# Patient Record
Sex: FEMALE | Race: ASIAN | NOT HISPANIC OR LATINO
[De-identification: names, ages, dates, MRNs, and addresses within clinical notes are randomized per-mention and may not be internally consistent; named-entity substitution may affect disease eponyms.]

---

## 2017-07-10 ENCOUNTER — APPOINTMENT (OUTPATIENT)
Dept: OPHTHALMOLOGY | Facility: CLINIC | Age: 72
End: 2017-07-10

## 2018-03-26 ENCOUNTER — APPOINTMENT (OUTPATIENT)
Dept: OPHTHALMOLOGY | Facility: CLINIC | Age: 73
End: 2018-03-26
Payer: MEDICARE

## 2018-03-26 PROCEDURE — ZZZZZ: CPT

## 2018-03-26 PROCEDURE — 92012 INTRM OPH EXAM EST PATIENT: CPT

## 2018-03-26 PROCEDURE — 92083 EXTENDED VISUAL FIELD XM: CPT

## 2018-03-26 PROCEDURE — 92015 DETERMINE REFRACTIVE STATE: CPT

## 2018-10-01 ENCOUNTER — APPOINTMENT (OUTPATIENT)
Dept: OPHTHALMOLOGY | Facility: CLINIC | Age: 73
End: 2018-10-01
Payer: MEDICARE

## 2018-10-01 PROCEDURE — 92133 CPTRZD OPH DX IMG PST SGM ON: CPT

## 2018-10-01 PROCEDURE — 92014 COMPRE OPH EXAM EST PT 1/>: CPT

## 2019-04-10 ENCOUNTER — APPOINTMENT (OUTPATIENT)
Dept: OPHTHALMOLOGY | Facility: CLINIC | Age: 74
End: 2019-04-10
Payer: MEDICARE

## 2019-04-10 PROCEDURE — 92083 EXTENDED VISUAL FIELD XM: CPT

## 2019-04-10 PROCEDURE — 92012 INTRM OPH EXAM EST PATIENT: CPT

## 2019-10-01 ENCOUNTER — APPOINTMENT (OUTPATIENT)
Dept: OPHTHALMOLOGY | Facility: CLINIC | Age: 74
End: 2019-10-01
Payer: MEDICARE

## 2019-10-01 ENCOUNTER — NON-APPOINTMENT (OUTPATIENT)
Age: 74
End: 2019-10-01

## 2019-10-01 PROCEDURE — 92014 COMPRE OPH EXAM EST PT 1/>: CPT

## 2019-10-01 PROCEDURE — 92250 FUNDUS PHOTOGRAPHY W/I&R: CPT

## 2020-03-20 ENCOUNTER — APPOINTMENT (OUTPATIENT)
Dept: PULMONOLOGY | Facility: CLINIC | Age: 75
End: 2020-03-20

## 2020-04-07 ENCOUNTER — APPOINTMENT (OUTPATIENT)
Dept: OPHTHALMOLOGY | Facility: CLINIC | Age: 75
End: 2020-04-07

## 2020-10-05 ENCOUNTER — APPOINTMENT (OUTPATIENT)
Dept: OPHTHALMOLOGY | Facility: CLINIC | Age: 75
End: 2020-10-05
Payer: MEDICARE

## 2020-10-05 ENCOUNTER — NON-APPOINTMENT (OUTPATIENT)
Age: 75
End: 2020-10-05

## 2020-10-05 PROCEDURE — 92133 CPTRZD OPH DX IMG PST SGM ON: CPT

## 2020-10-05 PROCEDURE — 92083 EXTENDED VISUAL FIELD XM: CPT

## 2020-10-05 PROCEDURE — 92014 COMPRE OPH EXAM EST PT 1/>: CPT

## 2021-06-14 ENCOUNTER — APPOINTMENT (OUTPATIENT)
Dept: CARDIOLOGY | Facility: CLINIC | Age: 76
End: 2021-06-14
Payer: MEDICARE

## 2021-06-14 VITALS
RESPIRATION RATE: 18 BRPM | BODY MASS INDEX: 29.93 KG/M2 | SYSTOLIC BLOOD PRESSURE: 177 MMHG | HEART RATE: 96 BPM | TEMPERATURE: 97.8 F | WEIGHT: 148.2 LBS | DIASTOLIC BLOOD PRESSURE: 76 MMHG | OXYGEN SATURATION: 95 %

## 2021-06-14 DIAGNOSIS — R07.9 CHEST PAIN, UNSPECIFIED: ICD-10-CM

## 2021-06-14 PROCEDURE — 93000 ELECTROCARDIOGRAM COMPLETE: CPT

## 2021-06-14 PROCEDURE — 99072 ADDL SUPL MATRL&STAF TM PHE: CPT

## 2021-06-14 PROCEDURE — 99204 OFFICE O/P NEW MOD 45 MIN: CPT

## 2021-06-14 RX ORDER — SITAGLIPTIN 100 MG/1
100 TABLET, FILM COATED ORAL
Qty: 30 | Refills: 0 | Status: ACTIVE | COMMUNITY
Start: 2021-04-21

## 2021-06-14 RX ORDER — LANCETS 33 GAUGE
EACH MISCELLANEOUS
Qty: 300 | Refills: 0 | Status: ACTIVE | COMMUNITY
Start: 2020-08-26

## 2021-06-14 RX ORDER — BLOOD SUGAR DIAGNOSTIC
STRIP MISCELLANEOUS
Qty: 300 | Refills: 0 | Status: ACTIVE | COMMUNITY
Start: 2020-08-26

## 2021-06-14 RX ORDER — BLOOD-GLUCOSE METER
W/DEVICE EACH MISCELLANEOUS
Qty: 1 | Refills: 0 | Status: ACTIVE | COMMUNITY
Start: 2021-05-10

## 2021-06-14 RX ORDER — FAMOTIDINE 20 MG/1
20 TABLET, FILM COATED ORAL
Qty: 60 | Refills: 0 | Status: ACTIVE | COMMUNITY
Start: 2021-01-20

## 2021-06-14 RX ORDER — ATORVASTATIN CALCIUM 10 MG/1
10 TABLET, FILM COATED ORAL
Qty: 30 | Refills: 0 | Status: ACTIVE | COMMUNITY
Start: 2021-06-02

## 2021-06-14 NOTE — PHYSICAL EXAM
[Well Developed] : well developed [Well Nourished] : well nourished [No Acute Distress] : no acute distress [Normal Conjunctiva] : normal conjunctiva [Normal Venous Pressure] : normal venous pressure [No Carotid Bruit] : no carotid bruit [Normal S1, S2] : normal S1, S2 [No Rub] : no rub [No Gallop] : no gallop [Clear Lung Fields] : clear lung fields [Good Air Entry] : good air entry [No Respiratory Distress] : no respiratory distress  [Soft] : abdomen soft [Non Tender] : non-tender [No Masses/organomegaly] : no masses/organomegaly [Normal Bowel Sounds] : normal bowel sounds [Normal Gait] : normal gait [No Edema] : no edema [No Clubbing] : no clubbing [No Cyanosis] : no cyanosis [No Varicosities] : no varicosities [No Rash] : no rash [No Skin Lesions] : no skin lesions [Moves all extremities] : moves all extremities [No Focal Deficits] : no focal deficits [Normal Speech] : normal speech [Alert and Oriented] : alert and oriented [Normal memory] : normal memory [de-identified] : 2/6 YULIANA DALEY

## 2021-06-14 NOTE — REASON FOR VISIT
[Hyperlipidemia] : hyperlipidemia [Hypertension] : hypertension [FreeTextEntry1] : 76 F HTN hyperilpidemia DM with CP and SOB.

## 2021-06-14 NOTE — DISCUSSION/SUMMARY
[FreeTextEntry1] : 76 F HTN DM hyperlipidemia hx of PDA repair with CP and SOB and palpitations.\par EKG for palpitations.\par CHECK ECHO.\par CHECK HOLTER.\par Continue medications for HTN and hyperlipidemia.\par

## 2021-06-14 NOTE — HISTORY OF PRESENT ILLNESS
[FreeTextEntry1] : 1. HTN: on medications.\par 2. Hyperipidemia: on statin.\par 3. DM: compliant with medication.\par 4. PDA: the patient reports a history of PDA repair in the past. Her symptoms include CP and palpitations. She has very limited ET due to knee pain.\par She denies cough or fevers.\par She reports that her CP is non-exertional without radiation lasting minutes.

## 2021-06-25 ENCOUNTER — APPOINTMENT (OUTPATIENT)
Dept: CARDIOLOGY | Facility: CLINIC | Age: 76
End: 2021-06-25
Payer: MEDICARE

## 2021-06-25 VITALS
SYSTOLIC BLOOD PRESSURE: 170 MMHG | OXYGEN SATURATION: 97 % | WEIGHT: 150 LBS | HEART RATE: 94 BPM | DIASTOLIC BLOOD PRESSURE: 77 MMHG | RESPIRATION RATE: 18 BRPM | TEMPERATURE: 97.6 F | BODY MASS INDEX: 30.3 KG/M2

## 2021-06-25 PROCEDURE — 99072 ADDL SUPL MATRL&STAF TM PHE: CPT

## 2021-06-25 PROCEDURE — 93306 TTE W/DOPPLER COMPLETE: CPT

## 2021-07-02 ENCOUNTER — NON-APPOINTMENT (OUTPATIENT)
Age: 76
End: 2021-07-02

## 2021-08-16 ENCOUNTER — APPOINTMENT (OUTPATIENT)
Dept: CARDIOLOGY | Facility: CLINIC | Age: 76
End: 2021-08-16

## 2021-11-29 ENCOUNTER — APPOINTMENT (OUTPATIENT)
Dept: OPHTHALMOLOGY | Facility: CLINIC | Age: 76
End: 2021-11-29
Payer: MEDICARE

## 2021-11-29 ENCOUNTER — NON-APPOINTMENT (OUTPATIENT)
Age: 76
End: 2021-11-29

## 2021-11-29 PROCEDURE — 92083 EXTENDED VISUAL FIELD XM: CPT

## 2021-11-29 PROCEDURE — 92014 COMPRE OPH EXAM EST PT 1/>: CPT

## 2021-11-29 PROCEDURE — 92133 CPTRZD OPH DX IMG PST SGM ON: CPT

## 2022-07-12 ENCOUNTER — APPOINTMENT (OUTPATIENT)
Dept: OPHTHALMOLOGY | Facility: CLINIC | Age: 77
End: 2022-07-12

## 2023-12-18 ENCOUNTER — APPOINTMENT (OUTPATIENT)
Dept: ORTHOPEDIC SURGERY | Facility: CLINIC | Age: 78
End: 2023-12-18
Payer: MEDICARE

## 2023-12-18 VITALS — HEIGHT: 60 IN | BODY MASS INDEX: 26.5 KG/M2 | WEIGHT: 135 LBS

## 2023-12-18 DIAGNOSIS — M47.26 OTHER SPONDYLOSIS WITH RADICULOPATHY, LUMBAR REGION: ICD-10-CM

## 2023-12-18 DIAGNOSIS — M79.606 PAIN IN LEG, UNSPECIFIED: ICD-10-CM

## 2023-12-18 PROCEDURE — 72100 X-RAY EXAM L-S SPINE 2/3 VWS: CPT

## 2023-12-18 PROCEDURE — 99204 OFFICE O/P NEW MOD 45 MIN: CPT

## 2023-12-18 NOTE — HISTORY OF PRESENT ILLNESS
[de-identified] : Patient is here for right leg pain that began 2 months ago. There was no inciting injury. She was seen by a knee specialist. She has done exercises to treat it. She takes Tylenol. She feels the pain from her ankle up to her hip. Denies N/T/Prior injury.   The patient's past medical history, past surgical history, medications and allergies were reviewed by me today and documented accordingly. In addition, the patient's family and social history, which were noncontributory to this visit, were reviewed also. Intake form was reviewed. The patient has no family history of arthritis.

## 2023-12-18 NOTE — PHYSICAL EXAM
[de-identified] : Constitutional: Well-nourished, well-developed, No acute distress Respiratory:  Good respiratory effort, no SOB Lymphatic: No regional lymphadenopathy, no lymphedema Psychiatric: Pleasant and normal affect, alert and oriented x3 Musculoskeletal: normal except where as noted in regional exam  Lumbar Spine Exam  APPEARANCE: + levoscoliosis, no marked deformities or malalignment, + loss of lordotic curvature of the lumbosacral spine. + poor posture POSITIVE TENDERNESS: + IL/SI/ST ligs, + TTP of b/l SI joints, + b/l lower lumbar tenderness and spasm noted in erector spinae and quadratus lumborum NONTENDER: no bony midline tenderness. ROM: Mildly limited in all directions, mildly painful at end range of flexion and extension RESISTIVE TESTING: painful 4/5 resisted flex/ext, sidebending b/l, and rotation SPECIAL TESTS: + Right  SLR, + slump test on the right, neg NOHEMY b/l, neg Trendelenburg b/l  PULSES: 2+ DP/PT pulses NEURO:  L1 - S2 intact to sensation and motor, DTRs 2+/4 patella and achilles   [de-identified] : The following radiographs were ordered and read by me during this patient's visit. I reviewed each radiograph in detail with the patient and discussed the findings as highlighted below.   2 views of the lumbar spine were obtained today that show degenerative changes and evidence of severe multilevel osteoarthritis.  No fracture, or dislocation seen at this time. There is levoscoliosis malalignment with straightening of normal lordosis. No other obvious osseous abnormality. Otherwise unremarkable.

## 2023-12-18 NOTE — REASON FOR VISIT
[Initial Visit] : an initial visit for [Family Member] : family member [FreeTextEntry2] : right leg pain

## 2023-12-18 NOTE — DISCUSSION/SUMMARY
[de-identified] : Discussed findings of today's exam and possible causes of patient's pain.  Educated patient on their most probable diagnosis of chronic intermittent right lower extremity pain with recent atraumatic exacerbation due to severe lumbar osteoarthritis and intermittent right lower extremity radiculopathy.  Reviewed possible courses of treatment, and we collaboratively decided best course of treatment at this time will include conservative management.  Patient started on a course of oral NSAIDs, prescription given for Naprosyn (We discussed all possible side effects of this medication).  Patient will be started on a course of home physical therapy to restore normal range of motion and strength as tolerated.  I also recommend we proceed with MRI of the lumbar spine and subsequent physiatry consultation to discuss risk/benefits of VI.  Patient has no surgical indications based on history and clinical exam, based on her age and medical comorbidities she is not a strong surgical candidate, she would likely benefit from nonsurgical management with physiatry prior to orthopedic spine consultation.  Patient is advised we can review MRI results over the phone as she would benefit from in person review with physiatry to discuss risk/benefits of epidural injections.  Patient and her sister appreciate and agree with current plan.    This note was generated using dragon medical dictation software.  A reasonable effort has been made for proofreading its contents, but typos may still remain.  If there are any questions or points of clarification needed please notify my office.

## 2023-12-29 ENCOUNTER — APPOINTMENT (OUTPATIENT)
Dept: MRI IMAGING | Facility: CLINIC | Age: 78
End: 2023-12-29
Payer: MEDICARE

## 2023-12-29 ENCOUNTER — OUTPATIENT (OUTPATIENT)
Dept: OUTPATIENT SERVICES | Facility: HOSPITAL | Age: 78
LOS: 1 days | End: 2023-12-29
Payer: MEDICARE

## 2023-12-29 DIAGNOSIS — M79.606 PAIN IN LEG, UNSPECIFIED: ICD-10-CM

## 2023-12-29 PROCEDURE — 72148 MRI LUMBAR SPINE W/O DYE: CPT | Mod: 26

## 2023-12-29 PROCEDURE — 72148 MRI LUMBAR SPINE W/O DYE: CPT

## 2024-01-08 ENCOUNTER — APPOINTMENT (OUTPATIENT)
Dept: ORTHOPEDIC SURGERY | Facility: CLINIC | Age: 79
End: 2024-01-08

## 2024-01-08 NOTE — PHYSICAL EXAM
[de-identified] : MR SPINE LUMBAR done in [facility]- ORDERED BY: JANIA BAGLEY PROCEDURE DATE: 12/29/2023   INTERPRETATION: CLINICAL INFORMATION: Lower back pain, radiculopathy  ADDITIONAL CLINICAL INFORMATION: Low back muscle strain S39.012A  TECHNIQUE: Multiplanar, multisequence MRI was performed of the lumbar spine. IV Contrast: None.  PRIOR STUDIES: No priors available for comparison.  FINDINGS:  LOCALIZER: No additional findings. BONES: 5 lumbar vertebral bodies are assumed. No acute fracture. ALIGNMENT: Mild retrolisthesis of L2 on L3 and L3 on L4. Grade 1 anterolisthesis of L4 on L5 and L5 on S1. Levoscoliosis of the lumbar spine with apex at L3. SACROILIAC JOINTS/SACRUM: Mild degenerative changes of the visualized superior sacroiliac joints. CONUS AND CAUDA EQUINA: The distal cord and conus are normal in signal. Conus terminates at L2. VISUALIZED INTRAPELVIC/INTRA-ABDOMINAL SOFT TISSUES: Bilateral renal cysts noted. PARASPINAL SOFT TISSUES: Mild fatty infiltration of the posterior paraspinal musculature.  INDIVIDUAL LEVELS:  T12-L1: No canal or neuroforaminal stenosis. L1-L2: Minimal disc bulge with disc osteophyte complex. No canal or neuroforaminal stenosis. L2-L3: Disc bulge with disc osteophyte complex asymmetric to the left. Mild bilateral facet arthrosis. Mild/moderate bilateral neural foraminal narrowing. No central canal stenosis. L3-L4: Disc bulge with disc osteophyte complex. Mild bilateral facet arthrosis. Moderate left and moderate/severe right neural foraminal narrowing. No central canal stenosis. L4-L5: Uncovering of the posterior disc space with disc bulge and disc osteophyte complex. Severe bilateral facet arthrosis. Moderate/severe right and mild left neural foraminal narrowing. Ligamentum flavum hypertrophy. Moderate central canal stenosis. L5-S1: Uncovering of the posterior disc space with disc bulge and disc osteophyte complex with superimposed left subarticular/foraminal disc protrusion. Mild right and moderate left facet arthrosis. Mild/moderate right and moderate/severe left neural foraminal narrowing. Moderate narrowing of the left lateral recess with mild narrowing of the right lateral recess. No central canal stenosis.  IMPRESSION:  Multilevel degenerative changes throughout the lumbar spine, as detailed above, resulting in up to moderate central canal narrowing at L4-L5. Multilevel neuroforaminal and lateral recess narrowing, as above.  --- End of Report ---  YOKO BARBOUR MD; Resident Radiologist This document has been electronically signed. VJ FRANK M.D., ATTENDING RADIOLOGIST This document has been electronically signed. Jan 4 2024 12:18PM

## 2024-01-08 NOTE — HISTORY OF PRESENT ILLNESS
[de-identified] : Patient is here for right leg pain that began 2.5 months ago. There was no inciting injury. She has done home exercises to treat it. She takes Tylenol. She feels the pain from her ankle up to her hip. Denies N/T/Prior injury. Patient had lumbar MRI done on 12/29/23 in [facility]; results were reviewed over the phone. She was recommended to consult PMR for lumbar DDD.   The patient's past medical history, past surgical history, medications and allergies were reviewed by me today and documented accordingly. In addition, the patient's family and social history, which were noncontributory to this visit, were reviewed also. Intake form was reviewed. The patient has no family history of arthritis.

## 2024-01-16 ENCOUNTER — RX RENEWAL (OUTPATIENT)
Age: 79
End: 2024-01-16

## 2024-01-16 RX ORDER — NAPROXEN 500 MG/1
500 TABLET ORAL
Qty: 60 | Refills: 0 | Status: ACTIVE | COMMUNITY
Start: 2023-12-18 | End: 1900-01-01